# Patient Record
Sex: MALE | Race: BLACK OR AFRICAN AMERICAN | NOT HISPANIC OR LATINO | Employment: FULL TIME | URBAN - METROPOLITAN AREA
[De-identification: names, ages, dates, MRNs, and addresses within clinical notes are randomized per-mention and may not be internally consistent; named-entity substitution may affect disease eponyms.]

---

## 2019-01-17 ENCOUNTER — OFFICE VISIT (OUTPATIENT)
Dept: URGENT CARE | Facility: CLINIC | Age: 34
End: 2019-01-17

## 2019-01-17 VITALS
SYSTOLIC BLOOD PRESSURE: 140 MMHG | WEIGHT: 224.8 LBS | OXYGEN SATURATION: 99 % | BODY MASS INDEX: 31.47 KG/M2 | HEIGHT: 71 IN | RESPIRATION RATE: 18 BRPM | TEMPERATURE: 97.5 F | HEART RATE: 85 BPM | DIASTOLIC BLOOD PRESSURE: 65 MMHG

## 2019-01-17 DIAGNOSIS — R10.31 RIGHT LOWER QUADRANT ABDOMINAL PAIN: Primary | ICD-10-CM

## 2019-01-17 PROCEDURE — G0382 LEV 3 HOSP TYPE B ED VISIT: HCPCS | Performed by: PHYSICIAN ASSISTANT

## 2019-01-17 RX ORDER — ONDANSETRON 4 MG/1
4 TABLET, ORALLY DISINTEGRATING ORAL EVERY 6 HOURS PRN
Qty: 20 TABLET | Refills: 0 | Status: SHIPPED | OUTPATIENT
Start: 2019-01-17

## 2019-01-17 NOTE — LETTER
January 17, 2019     Patient: Ruben Trejo   YOB: 1985   Date of Visit: 1/17/2019       To Whom it May Concern:    Gallo Salmon was seen in my clinic on 1/17/2019  He may return to work on 01/19/2019  If you have any questions or concerns, please don't hesitate to call  Sincerely,          Estelle Byrd PA-C        CC: No Recipients

## 2019-01-17 NOTE — PATIENT INSTRUCTIONS
Use medication as directed for symptomatic control  Motrin and/or Tylenol as needed for fevers and aches pains  Drink plenty of fluids stay well hydrated  Follow up with PCP in 3-5 days  Proceed to  ER if symptoms worsen  Abdominal Pain   AMBULATORY CARE:   Abdominal pain  can be dull, achy, or sharp  You may have pain in one area of your abdomen, or in your entire abdomen  Your pain may be caused by a condition such as constipation, food sensitivity or poisoning, infection, or a blockage  Abdominal pain can also be from a hernia, appendicitis, or an ulcer  Liver, gallbladder, or kidney conditions can also cause abdominal pain  The cause of your abdominal pain may be unknown  Seek care immediately if:   · You have new chest pain or shortness of breath  · You have pulsing pain in your upper abdomen or lower back that suddenly becomes constant  · Your pain is in the right lower abdominal area and worsens with movement  · You have a fever over 100 4°F (38°C) or shaking chills  · You are vomiting and cannot keep food or liquids down  · Your pain does not improve or gets worse over the next 8 to 12 hours  · You see blood in your vomit or bowel movements, or they look black and tarry  · Your skin or the whites of your eyes turn yellow  · You are a woman and have a large amount of vaginal bleeding that is not your monthly period  Contact your healthcare provider if:   · You have pain in your lower back  · You are a man and have pain in your testicles  · You have pain when you urinate  · You have questions or concerns about your condition or care  Treatment for abdominal pain  may include medicine to calm your stomach, prevent vomiting, or decrease pain  Follow up with your healthcare provider as directed:  Write down your questions so you remember to ask them during your visits     © 2017 [employer] Information is for End User's use only and may not be sold, redistributed or otherwise used for commercial purposes  All illustrations and images included in CareNotes® are the copyrighted property of [employer]  or [employer]  The above information is an  only  It is not intended as medical advice for individual conditions or treatments  Talk to your doctor, nurse or pharmacist before following any medical regimen to see if it is safe and effective for you

## 2019-01-17 NOTE — PROGRESS NOTES
[facility]        NAME: Fransisco Rolle is a 34 y o  male  : 1985    MRN: [id number]  DATE: 2019  TIME: 2:25 PM    Assessment and Plan   Right lower quadrant abdominal pain [R10 31]  1  Right lower quadrant abdominal pain  ondansetron (ZOFRAN-ODT) 4 mg disintegrating tablet         Patient Instructions     Use medication as directed for symptomatic control  Motrin and/or Tylenol as needed for fevers and aches pains  Drink plenty of fluids stay well hydrated  Follow up with PCP in 3-5 days  Proceed to  ER if symptoms worsen  Chief Complaint     Chief Complaint   Patient presents with    Abdominal Pain     Stomach pain started 2 a m , experiencing sharp pains, took pepto bismol  History of Present Illness       51-year-old male presents with abdominal pain  Patient reports he was starting to feel some nausea last night before he went to bed  Patient woke up last night with more abdominal pain but has improved over the course of the day  Was able to eat lunch today which was trimmed bowel for a toe the was not able to finish the whole plate  Denies any fevers or chills  No chest pain  No vomiting or diarrhea  Has been taking Pepto-Bismol which seemed to help out with the symptoms  Patient reports his girlfriend has also been sick with some abdominal pain and vomiting  Also was just notified that his girlfriend may be pregnant yesterday  Abdominal Pain   This is a new problem  The current episode started yesterday  The onset quality is gradual  The problem occurs intermittently  The problem has been gradually improving  The pain is located in the periumbilical region  The pain is moderate  The quality of the pain is aching and cramping  The abdominal pain does not radiate  Associated symptoms include nausea  Pertinent negatives include no anorexia, arthralgias, diarrhea, fever, myalgias or vomiting  The pain is aggravated by eating   The pain is relieved by nothing  Treatments tried: Pepto-Bismol  The treatment provided no relief  There is no history of abdominal surgery  Review of Systems   Review of Systems   Constitutional: Negative  Negative for fever  HENT: Negative  Eyes: Negative  Respiratory: Negative  Cardiovascular: Negative  Gastrointestinal: Positive for abdominal pain and nausea  Negative for anorexia, diarrhea and vomiting  Musculoskeletal: Negative  Negative for arthralgias and myalgias  Skin: Negative  Neurological: Negative  Current Medications       Current Outpatient Prescriptions:     Albuterol Sulfate (VENTOLIN HFA IN), Inhale as needed, Disp: , Rfl:     bismuth subsalicylate (PEPTO BISMOL) 524 mg/30 mL oral suspension, Take 15 mL by mouth every 6 (six) hours as needed for indigestion, Disp: , Rfl:     Multiple Vitamins-Minerals (ONE-A-DAY MENS PRO EDGE PO), Take by mouth, Disp: , Rfl:     ondansetron (ZOFRAN-ODT) 4 mg disintegrating tablet, Take 1 tablet (4 mg total) by mouth every 6 (six) hours as needed for nausea or vomiting, Disp: 20 tablet, Rfl: 0    Current Allergies     Allergies as of 01/17/2019 - Reviewed 01/17/2019   Allergen Reaction Noted    Other  01/17/2019            The following portions of the patient's history were reviewed and updated as appropriate: allergies, current medications, past family history, past medical history, past social history, past surgical history and problem list      Past Medical History:   Diagnosis Date    Asthma     seasonal       Past Surgical History:   Procedure Laterality Date    NO PAST SURGERIES         Family History   Problem Relation Age of Onset    Other Father          Medications have been verified          Objective   /65   Pulse 85   Temp 97 5 °F (36 4 °C)   Resp 18   Ht 5' 11" (1 803 m)   Wt 102 kg (224 lb 12 8 oz)   SpO2 99%   BMI 31 35 kg/m²        Physical Exam     Physical Exam   Constitutional: He is oriented to person, place, and time  He appears well-developed and well-nourished  No distress  HENT:   Head: Normocephalic and atraumatic  Right Ear: External ear normal    Left Ear: External ear normal    Nose: Nose normal    Mouth/Throat: Oropharynx is clear and moist  No oropharyngeal exudate  Eyes: Conjunctivae are normal  Right eye exhibits no discharge  Left eye exhibits no discharge  Neck: Normal range of motion  Neck supple  Cardiovascular: Normal rate, regular rhythm, normal heart sounds and intact distal pulses  No murmur heard  Pulmonary/Chest: Effort normal and breath sounds normal  No respiratory distress  He has no wheezes  He has no rales  Abdominal: Soft  Bowel sounds are normal  There is tenderness in the right lower quadrant, periumbilical area and left lower quadrant  There is no rigidity, no guarding, no tenderness at McBurney's point and negative Wade's sign  Musculoskeletal: Normal range of motion  Lymphadenopathy:     He has no cervical adenopathy  Neurological: He is alert and oriented to person, place, and time  Skin: Skin is warm and dry  Psychiatric: He has a normal mood and affect  Nursing note and vitals reviewed